# Patient Record
Sex: MALE | Employment: FULL TIME | URBAN - METROPOLITAN AREA
[De-identification: names, ages, dates, MRNs, and addresses within clinical notes are randomized per-mention and may not be internally consistent; named-entity substitution may affect disease eponyms.]

---

## 2017-05-17 ENCOUNTER — HOSPITAL ENCOUNTER (OUTPATIENT)
Dept: PHYSICAL THERAPY | Age: 27
Discharge: HOME | End: 2017-05-17
Payer: COMMERCIAL

## 2017-05-17 PROCEDURE — 97162 PT EVAL MOD COMPLEX 30 MIN: CPT

## 2017-05-17 PROCEDURE — 97535 SELF CARE MNGMENT TRAINING: CPT

## 2017-05-17 PROCEDURE — 97140 MANUAL THERAPY 1/> REGIONS: CPT

## 2017-05-17 NOTE — PROGRESS NOTES
PHYSICAL THERAPY - DAILY TREATMENT NOTE    Patient Name: Mery Mcarthur        Date: 2017  : 1990   YES Patient  Verified  Visit #:      of   12  Insurance: Payor: /      In time: 7:40 Out time: 8:20   Total Treatment Time: 40     Medicare Time Tracking (below)   Total Timed Codes (min):  na 1:1 Treatment Time:  na     TREATMENT AREA =  Low back pain [M54.5]    SUBJECTIVE  Pain Level (on 0 to 10 scale):    Medication Changes/New allergies or changes in medical history, any new surgeries or procedures? NO    If yes, update Summary List   Subjective Functional Status/Changes:  []  No changes reported     SEE IE          OBJECTIVE      10 min Manual Therapy: DTM R RF, QL, Gm, Shot gun tech, R IS ant inn, L5 L ERS, L on L SI lulu   Rationale:      decrease pain, increase ROM and increase tissue extensibility to improve patient's ability to perform ADLs     min Patient Education:  YES  Reviewed HEP   []  Progressed/Changed HEP based on: Other Objective/Functional Measures:    SEE IE     Post Treatment Pain Level (on 0 to 10) scale:       ASSESSMENT  Assessment/Changes in Function:     SEE IE     []  See Progress Note/Recertification   Patient will continue to benefit from skilled PT services to modify and progress therapeutic interventions, address functional mobility deficits, address ROM deficits, address strength deficits, analyze and address soft tissue restrictions, analyze and cue movement patterns, analyze and modify body mechanics/ergonomics and assess and modify postural abnormalities to attain remaining goals. Progress toward goals / Updated goals:         PLAN  []  Upgrade activities as tolerated YES Continue plan of care   []  Discharge due to :    []  Other:      Therapist: Lashawn Diane, PT, OCS, SCS, CSCS    Date: 2017 Time: 7:38 AM       No future appointments.

## 2017-05-17 NOTE — PROGRESS NOTES
[facility] PHYSICAL THERAPY  [address] [place] [address] 201,[facility], [address] [facility] - Phone: [phone]  Fax: [phone] / [phone] [facility]  Patient Name: Luke Oliver : 1990   Medical   Diagnosis: Low back pain [M54.5] Treatment Diagnosis: Low back pain [M54.5]   Onset Date:      Referral Source: Belinda Cortes MD Start of Care [facility] [place]): 2017   Prior Hospitalization: See medical history Provider #: [id number]   Prior Level of Function: Pain free ADLs   Comorbidities: none   Medications: Verified on Patient Summary List   The Plan of Care and following information is based on the information from the initial evaluation.   ===========================================================================================  Assessment / key information:  Luke Oliver is a 24 y.o.  yo male with Dx of Low back pain [M54.5]. He reports insidious onset of low back pain in . He currently rates his pain as 8/10 at worst, 0/10 at best, primarily located at R lower lumbar region. He complains of difficulty and increase pain with standing >30min and bending forward. He also complains of occasional numbness at plantar aspect of his R foot with prolonged sitting. He reports previous MRI showed a bulging disc( Pt did not know the level). Objective Findings:  Lumbar ROM: Flx  = limited by 20%, Ext = limited by 80%, Rot: R = limited by 50%, L = limited by 30%. Manual Muscle Testing:  R hip abd and ext are Reduced. Lumbar/SI Screening:   R illiosacral ant innominate, L5  L ERS, L on L SI innominate noted.    Pt instructed in HEP and will f/u in clinic for PT.  ===========================================================================================  Eval Complexity: History LOW Complexity : Zero comorbidities / personal factors that will impact the outcome / POC;  Examination  MEDIUM Complexity : 3 Standardized tests and measures addressing body structure, function, activity limitation and / or participation in recreation ; Presentation MEDIUM Complexity : Evolving with changing characteristics ; Decision Making MEDIUM Complexity : FOTO score of 26-74; Overall Complexity MEDIUM  Problem List: pain affecting function, decrease ROM, decrease strength, decrease ADL/ functional abilitiies, decrease activity tolerance and decrease flexibility/ joint mobility   Treatment Plan may include any combination of the following: Therapeutic exercise, Therapeutic activities, Neuromuscular re-education, Physical agent/modality, Manual therapy, Patient education, Self Care training and Functional mobility training  Patient / Family readiness to learn indicated by: asking questions, trying to perform skills and interest  Persons(s) to be included in education: patient (P)  Barriers to Learning/Limitations: no  Measures taken: FOTO = 50%   Patient Goal (s): Decrease pain    Patient self reported health status: poor  Rehabilitation Potential: good   Short Term Goals: To be accomplished in  1-2  weeks:  1. Independent with HEP. 2. Decrease max pain 25-50% to assist with ADLs   Long Term Goals: To be accomplished in  3-4  weeks:  1. Decrease max pain 50-75% to assist with ADLs  2. Increase FOTO score to 65% to show functional improvment. 3.  Will rate  >/= +5 on Global Rating of Change and be prepared to DC to HEP. Frequency / Duration:   Patient to be seen  2-3  times per week for 3-4  weeks:  Patient / Caregiver education and instruction: self care and exercises    Therapist Signature: Eric Lazar DPT, OCS, SCS, CSCS Date: 9/19/5814   Certification Period: na Time: 8:30 AM   ===========================================================================================  I certify that the above Physical Therapy Services are being furnished while the patient is under my care.   I agree with the treatment plan and certify that this therapy is necessary. Physician Signature:        Date:       Time:     Please sign and return to [facility] or you may fax the signed copy to [phone]. Thank you.

## 2017-05-19 ENCOUNTER — HOSPITAL ENCOUNTER (OUTPATIENT)
Dept: PHYSICAL THERAPY | Age: 27
Discharge: HOME | End: 2017-05-19
Payer: COMMERCIAL

## 2017-05-19 PROCEDURE — 97110 THERAPEUTIC EXERCISES: CPT

## 2017-05-19 PROCEDURE — 97140 MANUAL THERAPY 1/> REGIONS: CPT

## 2017-05-19 NOTE — PROGRESS NOTES
PHYSICAL THERAPY - DAILY TREATMENT NOTE    Patient Name: Paul Anne        Date: 2017  : 1990   YES Patient  Verified  Visit #:      of   12  Insurance: Payor: Clotilda Meckel / Plan: Faustino Pearson / Product Type: HMO /      In time: 7:00 Out time: 7:50   Total Treatment Time: 50     Medicare Time Tracking (below)   Total Timed Codes (min):  na 1:1 Treatment Time:  na     TREATMENT AREA =  Low back pain [M54.5]    SUBJECTIVE  Pain Level (on 0 to 10 scale):  1-2  / 10   Medication Changes/New allergies or changes in medical history, any new surgeries or procedures? NO    If yes, update Summary List   Subjective Functional Status/Changes:  []  No changes reported     Feeling much better since the last visit. OBJECTIVE      40 min Therapeutic Exercise:  [x]  See flow sheet   Rationale:      increase ROM, increase strength and improve coordination to improve the patients ability to perform ADLs     10 min Manual Therapy: DTM R RF, QL, Gm, L 5 L FRS   Rationale:      decrease pain, increase ROM and increase tissue extensibility to improve patient's ability to perform ADLs       min Patient Education:  YES  Reviewed HEP   []  Progressed/Changed HEP based on: Other Objective/Functional Measures:    Cont to have increased soft tissue tension noted at R QL     Post Treatment Pain Level (on 0 to 10) scale:   0  / 10     ASSESSMENT  Assessment/Changes in Function:     Good magdiel to all initial Rx without increase in pain      []  See Progress Note/Recertification   Patient will continue to benefit from skilled PT services to modify and progress therapeutic interventions, address functional mobility deficits, address ROM deficits, address strength deficits, analyze and address soft tissue restrictions, analyze and cue movement patterns, analyze and modify body mechanics/ergonomics and assess and modify postural abnormalities to attain remaining goals.    Progress toward goals / Updated goals:    Independent with HEP     PLAN  []  Upgrade activities as tolerated YES Continue plan of care   []  Discharge due to :    []  Other:      Therapist: Damaris Guzman, PT, OCS, SCS, CSCS    Date: 5/19/2017 Time: 8:02 AM       Future Appointments  Date Time Provider Magan Silva   5/25/2017 7:00 AM Deondre Hoff PT [facility]

## 2017-05-25 ENCOUNTER — HOSPITAL ENCOUNTER (OUTPATIENT)
Dept: PHYSICAL THERAPY | Age: 27
Discharge: HOME | End: 2017-05-25
Payer: COMMERCIAL

## 2017-05-25 PROCEDURE — 97110 THERAPEUTIC EXERCISES: CPT

## 2017-05-25 PROCEDURE — 97140 MANUAL THERAPY 1/> REGIONS: CPT

## 2017-05-25 NOTE — PROGRESS NOTES
PHYSICAL THERAPY - DAILY TREATMENT NOTE    Patient Name: Sylvain Maxwell        Date: 2017  : 1990   YES Patient  Verified  Visit #:   3   of   12  Insurance: Payor: Helio Guerra / Plan: Bright Jones / Product Type: HMO /      In time: 7:10 Out time: 7:56   Total Treatment Time: 46     Medicare Time Tracking (below)   Total Timed Codes (min):  na 1:1 Treatment Time:  na     TREATMENT AREA =  Low back pain [M54.5]    SUBJECTIVE  Pain Level (on 0 to 10 scale):  0  / 10   Medication Changes/New allergies or changes in medical history, any new surgeries or procedures? NO    If yes, update Summary List   Subjective Functional Status/Changes:  []  No changes reported     I have been doing the HEP and it has been helping          OBJECTIVE    38 min Therapeutic Exercise: [x] See flow sheet   Rationale: increase ROM, increase strength and improve coordination to improve the patients ability to perform ADLs      8 min Manual Therapy: DTM R RF, QL, Gm   Rationale: decrease pain, increase ROM and increase tissue extensibility to improve patient's ability to perform ADLs        min Patient Education:  YES  Reviewed HEP   []  Progressed/Changed HEP based on: Other Objective/Functional Measures:    No lumbar or SI innominate noted today     Post Treatment Pain Level (on 0 to 10) scale:   0  / 10     ASSESSMENT  Assessment/Changes in Function:     Good magdiel to all Rx without increase in pain      []  See Progress Note/Recertification   Patient will continue to benefit from skilled PT services to modify and progress therapeutic interventions, address functional mobility deficits, address ROM deficits, address strength deficits, analyze and address soft tissue restrictions, analyze and cue movement patterns, analyze and modify body mechanics/ergonomics and assess and modify postural abnormalities to attain remaining goals.    Progress toward goals / Updated goals:    Progressing well with pain reduction PLAN  []  Upgrade activities as tolerated YES Continue plan of care   []  Discharge due to :    []  Other:      Therapist: Hue Grayson, PT, OCS, SCS, CSCS    Date: 5/25/2017 Time: 7:09 AM       No future appointments.

## 2017-06-01 ENCOUNTER — HOSPITAL ENCOUNTER (OUTPATIENT)
Dept: PHYSICAL THERAPY | Age: 27
Discharge: HOME | End: 2017-06-01
Payer: COMMERCIAL

## 2017-06-01 PROCEDURE — 97110 THERAPEUTIC EXERCISES: CPT

## 2017-06-01 PROCEDURE — 97140 MANUAL THERAPY 1/> REGIONS: CPT

## 2017-06-01 NOTE — PROGRESS NOTES
PHYSICAL THERAPY - DAILY TREATMENT NOTE    Patient Name: Lyssa Lang        Date: 2017  : 1990   YES Patient  Verified  Visit #:     Insurance: Payor: Gale Penn / Plan: Samina Weiss / Product Type: HMO /      In time: 505 Out time: 550   Total Treatment Time: 45       TREATMENT AREA = Low back pain [M54.5]    SUBJECTIVE  Pain Level (on 0 to 10 scale):  2  / 10   Medication Changes/New allergies or changes in medical history, any new surgeries or procedures?     NO    If yes, update Summary List   Subjective Functional Status/Changes:  []  No changes reported     Reports increase in s/s after going to gym for leg day since last visit           OBJECTIVE  Modalities Rationale:      to improve patient's ability to    min [] Estim, type/location:                                      []  att     []  unatt     []  w/US     []  w/ice    []  w/heat    min []  Mechanical Traction: type/lbs                   []  pro   []  sup   []  int   []  cont    []  before manual    []  after manual    min []  Ultrasound, settings/location:      min []  Iontophoresis w/ dexamethasone, location:                                               []  take home patch       []  in clinic    min []  Ice     []  Heat    location/position:     min []  Vasopneumatic Device, press/temp:     min []  Other:    [] Skin assessment post-treatment (if applicable):    []  intact    []  redness- no adverse reaction     []redness  adverse reaction:        35 min Therapeutic Exercise:  [x]  See flow sheet   Rationale:      increase ROM and increase strength to improve the patients ability to perform ADLs     10 Min Manual Therapy: DTM to (L) piriformis, (R) QL, glute med, f/b pubic clearing technique, MET for [facility] L5/S1   Rationale:      decrease pain, increase ROM and increase tissue extensibility to improve patient's ability to perform ADLs      min Patient Education:  YES  Reviewed HEP   []  Progressed/Changed HEP based on: Other Objective/Functional Measures:    Demonstrates soreness in (R) QL, (R) glute med, with increased hypertonicity in (R) gluteal region and lumbar region noted      Post Treatment Pain Level (on 0 to 10) scale:   0  / 10     ASSESSMENT  Assessment/Changes in Function:     Progressing slowly with overall function and ability to return to gym based oroigram      []  See Progress Note/Recertification   Patient will continue to benefit from skilled PT services to modify and progress therapeutic interventions, address functional mobility deficits, address ROM deficits, address strength deficits, analyze and address soft tissue restrictions and analyze and cue movement patterns to attain remaining goals. Progress toward goals / Updated goals:    No changes towards goals this visit, will monitor and progress as able      PLAN  []  Upgrade activities as tolerated YES Continue plan of care   []  Discharge due to :    []  Other:      Therapist: Vivien Huang DPT, CIMT    Date: 6/1/2017 Time: 5:31 PM       No future appointments.

## 2017-06-15 ENCOUNTER — HOSPITAL ENCOUNTER (OUTPATIENT)
Dept: PHYSICAL THERAPY | Age: 27
Discharge: HOME | End: 2017-06-15
Payer: COMMERCIAL

## 2017-06-15 PROCEDURE — 97110 THERAPEUTIC EXERCISES: CPT

## 2017-06-15 NOTE — PROGRESS NOTES
PHYSICAL THERAPY - DAILY TREATMENT NOTE    Patient Name: Marquis Maurer        Date: 6/15/2017  : 1990   YES Patient  Verified  Visit #:      of   12  Insurance: Payor: Flakita Tejeda / Plan: Nikko Darshan / Product Type: HMO /      In time: 7:40 Out time: 8:35   Total Treatment Time: 55     Medicare Time Tracking (below)   Total Timed Codes (min):  na 1:1 Treatment Time:  na     TREATMENT AREA =  Low back pain [M54.5]    SUBJECTIVE  Pain Level (on 0 to 10 scale):  0  / 10   Medication Changes/New allergies or changes in medical history, any new surgeries or procedures? NO    If yes, update Summary List   Subjective Functional Status/Changes:  []  No changes reported     I still feel the numbness at the bottom of my foot when I sit for a long period of time, but it is getting much better. OBJECTIVE    55 min Therapeutic Exercise: [x] See flow sheet   Rationale: increase ROM, increase strength and improve coordination to improve the patients ability to perform ADLs         min Patient Education:  YES  Reviewed HEP   []  Progressed/Changed HEP based on: Other Objective/Functional Measures:    Demonstrate instability with L 1/2 kneel      Post Treatment Pain Level (on 0 to 10) scale:   0  / 10     ASSESSMENT  Assessment/Changes in Function:     Good magdiel to all Rx without increase in pain      []  See Progress Note/Recertification   Patient will continue to benefit from skilled PT services to modify and progress therapeutic interventions, address functional mobility deficits, address ROM deficits, address strength deficits, analyze and address soft tissue restrictions, analyze and cue movement patterns, analyze and modify body mechanics/ergonomics and assess and modify postural abnormalities to attain remaining goals.    Progress toward goals / Updated goals:    Slowly progressing with pain reduction      PLAN  []  Upgrade activities as tolerated YES Continue plan of care   []  Discharge due to :    []  Other:      Therapist: Abelardo Anna, PT, OCS, SCS, CSCS    Date: 6/15/2017 Time: 6:33 AM       Future Appointments  Date Time Provider Magan Silva   6/15/2017 7:30 AM Taylor Spaulding, PT [facility]   6/20/2017 5:30 PM Taylor Spaulding PT [facility]

## 2017-06-20 ENCOUNTER — APPOINTMENT (OUTPATIENT)
Dept: PHYSICAL THERAPY | Age: 27
End: 2017-06-20
Payer: COMMERCIAL

## 2017-06-23 ENCOUNTER — HOSPITAL ENCOUNTER (OUTPATIENT)
Dept: PHYSICAL THERAPY | Age: 27
Discharge: HOME | End: 2017-06-23
Payer: COMMERCIAL

## 2017-06-23 PROCEDURE — 97140 MANUAL THERAPY 1/> REGIONS: CPT

## 2017-06-23 PROCEDURE — 97110 THERAPEUTIC EXERCISES: CPT

## 2017-06-23 NOTE — PROGRESS NOTES
[facility] PHYSICAL THERAPY  [address] [place] [address] 51, [facility] 201,[facility], 70 [facility] - Phone: [phone]  Fax: [phone]  DISCHARGE NOTE  Patient Name: Mery Mcarthur : 1990   Treatment/Medical Diagnosis: Low back pain [M54.5]   Referral Source: Sharan Corley MD     Date of Initial Visit: 17 Attended Visits: 6 Missed Visits: 1     SUMMARY OF TREATMENT  Mery Mcarthur has been seen at our clinic 2-3x/wk for a total of 6 visits. Pt treatment has consisted of  therapeutic exercise for lumbar ROM, hip/core strengthening, and manual therapy(lumbar/SIjt mobilization and deep tissue mobilization at R Rectus Femoris, QL, GLute med)  CURRENT STATUS  Pt has had a good tolerance to physical therapy treatment. He reports improved ~90% improvement with his low back pain. He continues to demonstrate some hip instabilities. He has not returned to workout regime. However, we feel that he will bel able to continue to progress with his pain reduction and function independently at this time. Goal/Measure of Progress Goal Met? 1. Decrease Max pain by 50-75% to assist with ADLs   Status at last Eval: 8/10 Current Status: 1/10 yes   2. Increase FOTO score to 65 % show functional improvement   Status at last Eval: 50% Current Status: 63% progressing   3. Will rate >/= +5 on Global Rating of Change and be prepared to DC to HEP. Status at last Eval: na Current Status: +6 yes       RECOMMENDATIONS  Discharge with HEP. If you have any questions/comments please contact us directly at [phone]. Thank you for allowing us to assist in the care of your patient.     Therapist Signature: Lashawn Diane, DPT, OCS, SCS, CSCS Date: 2017     Time: 8:52 AM

## 2017-06-23 NOTE — PROGRESS NOTES
PHYSICAL THERAPY - DAILY TREATMENT NOTE    Patient Name: Shankar Lerma        Date: 2017  : 1990   YES Patient  Verified  Visit #:      12  Insurance: Payor: Kaz George / Plan: Rollo Filter / Product Type: HMO /      In time: 6:40 Out time: 7:20   Total Treatment Time: 40     Medicare Time Tracking (below)   Total Timed Codes (min):  na 1:1 Treatment Time:  na     TREATMENT AREA =  Low back pain [M54.5]    SUBJECTIVE  Pain Level (on 0 to 10 scale):  1  / 10   Medication Changes/New allergies or changes in medical history, any new surgeries or procedures? NO    If yes, update Summary List   Subjective Functional Status/Changes:  []  No changes reported     My LB is getting better. I dont really feel the pain. I just have a light pain in my mid back today          OBJECTIVE    30 min Therapeutic Exercise: [x] See flow sheet   Rationale: increase ROM, increase strength and improve coordination to improve the patients ability to perform ADLs       10 min Manual Therapy: DTM para T/S manip   Rationale: decrease pain, increase ROM and increase tissue extensibility to improve patient's ability to perform ADLs         min Patient Education:  YES  Reviewed HEP   []  Progressed/Changed HEP based on: Other Objective/Functional Measures:    FOTO = 63  GROC - +6     Post Treatment Pain Level (on 0 to 10) scale:   0  / 10     ASSESSMENT  Assessment/Changes in Function:     Good magdiel to all Rx without increase in pain      []  See Progress Note/Recertification   Patient will continue to benefit from skilled PT services to modify and progress therapeutic interventions, address functional mobility deficits, address ROM deficits, address strength deficits, analyze and address soft tissue restrictions, analyze and cue movement patterns, analyze and modify body mechanics/ergonomics and assess and modify postural abnormalities to attain remaining goals.    Progress toward goals / Updated goals:    Progressing well with symptom reduction      PLAN  []  Upgrade activities as tolerated YES Continue plan of care   []  Discharge due to :    []  Other:      Therapist: Heidi Horne, PT, OCS, SCS, CSCS    Date: 6/23/2017 Time: 6:58 AM       No future appointments.

## 2017-06-30 ENCOUNTER — HOSPITAL ENCOUNTER (OUTPATIENT)
Dept: PHYSICAL THERAPY | Age: 27
Discharge: HOME | End: 2017-06-30
Payer: COMMERCIAL

## 2017-06-30 PROCEDURE — 97535 SELF CARE MNGMENT TRAINING: CPT

## 2017-06-30 PROCEDURE — 97140 MANUAL THERAPY 1/> REGIONS: CPT

## 2017-06-30 PROCEDURE — 97162 PT EVAL MOD COMPLEX 30 MIN: CPT

## 2017-06-30 NOTE — IP AVS SNAPSHOT
Summary of Care Report The Summary of Care report has been created to help improve care coordination. Users with access to [employer] or [address] (Web-based application) may access additional patient information including the Discharge Summary. If you are not currently a [address] user and need more information, please call the number listed below in the [place] [address] section and ask to be connected with Medical Records. Facility Information Name Address Phone [address] [facility] [place] [id number] [phone] Patient Information Patient Name Sex KATHRYN Aguilar ([id number]) Male 1990 Discharge Information Admitting Provider Service Area Unit  
 (none) [address] [facility] / [phone] Discharge Provider Discharge Date/Time Discharge Disposition Destination (none) (none) (none) (none) Patient Language Language ENGLISH [13] You are allergic to the following Not on File Current Discharge Medication List  
  
Notice You have not been prescribed any medications. Follow-up Information None Discharge Instructions None Chart Review Routing History No Routing History on File

## 2017-06-30 NOTE — PROGRESS NOTES
[facility] PHYSICAL THERAPY  [address] [place] [address] 51, Jerre Hunger 201,[place], [address] [facility] [facility] [address] - Phone: [phone]  Fax: [phone] / 1568 [facility]  Patient Name: Julio Petersen : 1990   Medical   Diagnosis: Neck pain [M54.2] Treatment Diagnosis: Neck pain [M54.2]   Onset Date: ~3mo ago     Referral Source: Al Haas MD Start of Care [facility], [place]): 2017   Prior Hospitalization: See medical history Provider #: [id number]   Prior Level of Function: Pain free ADLs   Comorbidities: none   Medications: Verified on Patient Summary List   The Plan of Care and following information is based on the information from the initial evaluation.   ===========================================================================================  Assessment / key information:  Julio Petersen is a 24 y.o.  yo male with Dx of Neck pain [M54.2]. He reports insidious onset of R side neck pain ~3month ago. He currently rates hie pain as 9/10 at worst, 0/10 at best, primarily located at R upper trapezius and upper back region. He complains of difficulty and increase pain with prolonged sitting. Objective Findings:  Cervical ROM: Flx  = limited by 10%, Ext = WNL, Rot: R = limited by 20%, L =limited by 10%, Thoracic ROM are limited in R Rot by 30% and L Rot by 20%. Palpation:  Decrease mobility noted from T1-T7. Elevated 1st rib noted on R. Pt instructed in HEP and will f/u in clinic for PT.  ===========================================================================================  Eval Complexity: History LOW Complexity : Zero comorbidities / personal factors that will impact the outcome / POC;  Examination  MEDIUM Complexity : 3 Standardized tests and measures addressing body structure, function, activity limitation and / or participation in recreation ; Presentation MEDIUM Complexity : Evolving with changing characteristics ;   Decision Making MEDIUM Complexity : FOTO score of 26-74; Overall Complexity MEDIUM  Problem List: pain affecting function, decrease ROM, decrease strength, decrease ADL/ functional abilitiies, decrease activity tolerance and decrease flexibility/ joint mobility   Treatment Plan may include any combination of the following: Therapeutic exercise, Therapeutic activities, Neuromuscular re-education, Physical agent/modality, Manual therapy, Patient education, Self Care training and Functional mobility training  Patient / Family readiness to learn indicated by: asking questions, trying to perform skills and interest  Persons(s) to be included in education: patient (P)  Barriers to Learning/Limitations: no  Measures taken: FOTO = 60%   Patient Goal (s): Decrease pain    Patient self reported health status: fair  Rehabilitation Potential: good   Short Term Goals: To be accomplished in  1-2  weeks:  1. Independent with HEP. 2. Decrease max pain 25-50% to assist with ADLs   Long Term Goals: To be accomplished in  3-4  weeks:  1. Decrease max pain 50-75% to assist with ADLs  2. Increase FOTO score to 70% to show functional improvment  3. Will rate  >/= +5 on Global Rating of Change and be prepared to DC to HEP. Frequency / Duration:   Patient to be seen  2-3  times per week for 3-4  weeks:  Patient / Caregiver education and instruction: self care and exercises    Therapist Signature: Johnnie Quinteros DPT, OCS, SCS, CSCS Date: 0/90/7769   Certification Period: na Time: 8:38 AM   ==================================================================================  I certify that the above Physical Therapy Services are being furnished while the patient is under my care. I agree with the treatment plan and certify that this therapy is necessary. Physician Signature:        Date:       Time:     Please sign and return to [facility] [place] or you may fax the signed copy to [phone]. Thank you.

## 2017-06-30 NOTE — PROGRESS NOTES
PHYSICAL THERAPY - DAILY TREATMENT NOTE    Patient Name: Coleman Gamez        Date: 2017  : 1990   YES Patient  Verified  Visit #:      of   12  Insurance: Payor: Selvin Cesar / Plan: Danitza Ho / Product Type: HMO /      In time: 8:05 Out time: 8:45   Total Treatment Time: 40     Medicare Time Tracking (below)   Total Timed Codes (min):  na 1:1 Treatment Time:  na     TREATMENT AREA =  Neck pain [M54.2]    SUBJECTIVE  Pain Level (on 0 to 10 scale):    Medication Changes/New allergies or changes in medical history, any new surgeries or procedures? NO    If yes, update Summary List   Subjective Functional Status/Changes:  []  No changes reported     SEE IE          OBJECTIVE    10 min Manual Therapy: T/S mob, DTM R Scalene, Pec, UT, and R 1st rib mob   Rationale:      decrease pain, increase ROM and increase tissue extensibility to improve patient's ability to perform ADLs     min Patient Education:  YES  Reviewed HEP   []  Progressed/Changed HEP based on: Other Objective/Functional Measures:    SEE IE     Post Treatment Pain Level (on 0 to 10) scale:       ASSESSMENT  Assessment/Changes in Function:     SEE IE     []  See Progress Note/Recertification   Patient will continue to benefit from skilled PT services to modify and progress therapeutic interventions, address functional mobility deficits, address ROM deficits, address strength deficits, analyze and address soft tissue restrictions, analyze and cue movement patterns, analyze and modify body mechanics/ergonomics and assess and modify postural abnormalities to attain remaining goals. Progress toward goals / Updated goals:         PLAN  []  Upgrade activities as tolerated YES Continue plan of care   []  Discharge due to :    []  Other:      Therapist: Patrick Lake, PT, OCS, SCS, CSCS    Date: 2017 Time: 8:36 AM       No future appointments.

## 2017-07-14 ENCOUNTER — HOSPITAL ENCOUNTER (OUTPATIENT)
Dept: PHYSICAL THERAPY | Age: 27
Discharge: HOME | End: 2017-07-14
Payer: COMMERCIAL

## 2017-07-14 PROCEDURE — 97110 THERAPEUTIC EXERCISES: CPT

## 2017-07-14 PROCEDURE — 97140 MANUAL THERAPY 1/> REGIONS: CPT

## 2017-07-14 NOTE — PROGRESS NOTES
PHYSICAL THERAPY - DAILY TREATMENT NOTE    Patient Name: Darline Sprague        Date: 2017  : 1990   YES Patient  Verified  Visit #:      of   12  Insurance: Payor: Hadley Epley / Plan: [facility] / Product Type: HMO /      In time: 7:35 Out time: 8:25   Total Treatment Time: 50     Medicare Time Tracking (below)   Total Timed Codes (min):  na 1:1 Treatment Time:  na     TREATMENT AREA =  Neck pain [M54.2]    SUBJECTIVE  Pain Level (on 0 to 10 scale):  1-2   10   Medication Changes/New allergies or changes in medical history, any new surgeries or procedures? NO    If yes, update Summary List   Subjective Functional Status/Changes:  []  No changes reported     Its definitely better, but I still feel very tight           OBJECTIVE    40 min Therapeutic Exercise:  [x]  See flow sheet   Rationale:      increase ROM, increase strength and improve coordination to improve the patients ability to perform ADLs     10 min Manual Therapy: T/S mob, DTM R  UT   Rationale:      decrease pain, increase ROM and increase tissue extensibility to improve patient's ability to perform ADLs     min Patient Education:  YES  Reviewed HEP   []  Progressed/Changed HEP based on: Other Objective/Functional Measures:    Cont to have increased soft tissue tension noted at R UT     Post Treatment Pain Level (on 0 to 10) scale:   1  / 10     ASSESSMENT  Assessment/Changes in Function:     Good magdiel to all initial Rx without increase in pain      []  See Progress Note/Recertification   Patient will continue to benefit from skilled PT services to modify and progress therapeutic interventions, address functional mobility deficits, address ROM deficits, address strength deficits, analyze and address soft tissue restrictions, analyze and cue movement patterns, analyze and modify body mechanics/ergonomics and assess and modify postural abnormalities to attain remaining goals.    Progress toward goals / Updated goals:    Independent with all Rx     PLAN  []  Upgrade activities as tolerated YES Continue plan of care   []  Discharge due to :    []  Other:      Therapist: Shalini Levin, PT, OCS, SCS, CSCS    Date: 7/14/2017 Time: 6:31 AM       Future Appointments  Date Time Provider Magan Silva   7/14/2017 7:30 AM INA Olivares [facility]   7/17/2017 7:00 AM Bonita Flores PT [address] [place] [address]

## 2017-08-07 NOTE — PROGRESS NOTES
[address] [place] [address] PHYSICAL THERAPY   [facility] 51, Flakita Craig 201,[facility], [address] [facility] - Phone: [phone]  Fax: [phone] SUMMARY  Patient Name: Carolina Fernandes : 1990   Treatment/Medical Diagnosis: Neck pain [M54.2]   Referral Source: Christie Haile MD     Date of Initial Visit: 17 Attended Visits: 2 Missed Visits: 0     SUMMARY OF TREATMENT  Carolina Fernandes has been seen at our clinic 2-3x/wk for a total of 2 visits. Pt treatment has consisted of  therapeutic exercise for cervical ROM, postural correction, and manual therapy (deep tissue mobilizations and cervical/thoracic mobilzations)  CURRENT STATUS  Pt has had a good tolerance to physical therapy treatment. He reports significant improvement with his pain level after the 1st visit, and did not return to PT treatment after the 2nd visit. However, we believe that he will be able to continue to progress with his pain reduction and function independently at this point. Goal/Measure of Progress Goal Met? 1. Decrease Max pain by 50-75% to assist with ADLs   Status at last Eval: 9/10 Current Status: 1-2/10 yes   2. Increase FOTO score to 70 % show functional improvement   Status at last Eval: 60% Current Status: na n/a   3. Will rate >/= +5 on Global Rating of Change and be prepared to DC to HEP. Status at last Eval: na Current Status: na n/a       RECOMMENDATIONS  Discharge from physical therapy treatment with HEP. Specifics:   If you have any questions/comments please contact us directly at [phone]. Thank you for allowing us to assist in the care of your patient.     Therapist Signature: Sarah Garrison, IRMA, OCS, SCS, CSCS Date: 2017     Time: 12:12 PM

## 2017-08-31 ENCOUNTER — HOSPITAL ENCOUNTER (OUTPATIENT)
Dept: PHYSICAL THERAPY | Age: 27
Discharge: HOME | End: 2017-08-31
Payer: COMMERCIAL

## 2017-08-31 PROCEDURE — 97535 SELF CARE MNGMENT TRAINING: CPT

## 2017-08-31 PROCEDURE — 97162 PT EVAL MOD COMPLEX 30 MIN: CPT

## 2017-08-31 PROCEDURE — 97140 MANUAL THERAPY 1/> REGIONS: CPT

## 2017-08-31 NOTE — PROGRESS NOTES
[facility] PHYSICAL THERAPY  [address] [place] [address] 51, [address] 201,[place], [address] [facility] [address] - Phone: [phone]  Fax: [phone] / [address]  Patient Name: Gayle Hayden : 1990   Medical   Diagnosis: Chronic neck and back pain [M54.2, M54.9] Treatment Diagnosis: Chronic neck and back pain [M54.2, M54.9]   Onset Date: 17     Referral Source: Lars Devlin MD [facility]): 2017   Prior Hospitalization: See medical history Provider #: [id number]   Prior Level of Function: Pain free ADLs   Comorbidities: none   Medications: Verified on Patient Summary List   The Plan of Care and following information is based on the information from the initial evaluation.   ===========================================================================================  Assessment / garces information:  Gayle Hayden is a 24 y.o.  yo male with Dx of Chronic neck and back pain [M54.2, M54.9]. He reports waking up with R UE pain on 17. He currently rates his pain as 5/10 at worst, 0/10 at best, primarily located at R cervical region as well as lateral aspect of his R UE. He complains of difficulty and increase pain with looking down. Objective Findings:  Cervical ROM: Flx  = WNL, Ext = WNL, Rot: R = limited by 10%, L =WNL . Manual Muscle Testing: All UE strength are WNL. Special Test:   Compression/Distraction Test and Spurlings Test:  - bilaterally. Palpation:  Increased soft tissue tension noted at R scalene, UT, infraspinatus and teres. Decreased upper thoracic mobility noted on R.   Pt instructed in HEP and will f/u in clinic for PT.  ===========================================================================================  Eval Complexity: History LOW Complexity : Zero comorbidities / personal factors that will impact the outcome / POC;  Examination  MEDIUM Complexity : 3 Standardized tests and measures addressing body structure, function, activity limitation and / or participation in recreation ; Presentation MEDIUM Complexity : Evolving with changing characteristics ; Decision Making MEDIUM Complexity : FOTO score of 26-74; Overall Complexity MEDIUM  Problem List: pain affecting function, decrease ROM, decrease strength, decrease ADL/ functional abilitiies, decrease activity tolerance and decrease flexibility/ joint mobility   Treatment Plan may include any combination of the following: Therapeutic exercise, Therapeutic activities, Neuromuscular re-education, Physical agent/modality, Manual therapy, Patient education, Self Care training and Functional mobility training  Patient / Family readiness to learn indicated by: asking questions, trying to perform skills and interest  Persons(s) to be included in education: patient (P)  Barriers to Learning/Limitations: no  Measures taken: FOTO = 46%   Patient Goal (s): Decrease pain    Patient self reported health status: fair  Rehabilitation Potential: good   Short Term Goals: To be accomplished in  1-2  weeks:  1. Independent with HEP. 2. Decrease max pain 25-50% to assist with ADLs   Long Term Goals: To be accomplished in  3-4  weeks:  1. Decrease max pain 50-75% to assist with ADLs  2. Increase FOTO score to 65% to show functional improvment  3. Will rate  >/= +5 on Global Rating of Change and be prepared to DC to HEP. Frequency / Duration:   Patient to be seen  2-3  times per week for 3-4  weeks:  Patient / Caregiver education and instruction: self care and exercises    Therapist Signature: Perry Bonilla DPT, OCS, SCS, CSCS Date: 5/60/2874   Certification Period: na Time: 8:57 AM   ==================================================================================  I certify that the above Physical Therapy Services are being furnished while the patient is under my care.   I agree with the treatment plan and certify that this therapy is necessary. Physician Signature:        Date:       Time:     Please sign and return to [facility] or you may fax the signed copy to [phone]. Thank you.

## 2017-08-31 NOTE — PROGRESS NOTES
PHYSICAL THERAPY - DAILY TREATMENT NOTE    Patient Name: Jada Cook        Date: 2017  : 1990   YES Patient  Verified  Visit #:      12  Insurance: Payor: Elois Schlatter / Plan: Christine Mask / Product Type: HMO /      In time: 8:30 Out time: 9:10   Total Treatment Time: 40     Medicare Time Tracking (below)   Total Timed Codes (min):  na 1:1 Treatment Time:  na     TREATMENT AREA =  Chronic neck and back pain [M54.2, M54.9]    SUBJECTIVE  Pain Level (on 0 to 10 scale):    Medication Changes/New allergies or changes in medical history, any new surgeries or procedures? NO    If yes, update Summary List   Subjective Functional Status/Changes:  []  No changes reported     SEE IE          OBJECTIVE      10 min Manual Therapy: R rib mob, DTM R scalene, infra, teres, R 1st rib mob, T1 SG on R   Rationale:      decrease pain, increase ROM and increase tissue extensibility to improve patient's ability to perform ADLs     min Patient Education:  YES  Reviewed HEP   []  Progressed/Changed HEP based on: Other Objective/Functional Measures:    SEE IE     Post Treatment Pain Level (on 0 to 10) scale:       ASSESSMENT  Assessment/Changes in Function:     SEE IE     []  See Progress Note/Recertification   Patient will continue to benefit from skilled PT services to modify and progress therapeutic interventions, address functional mobility deficits, address ROM deficits, address strength deficits, analyze and address soft tissue restrictions, analyze and cue movement patterns, analyze and modify body mechanics/ergonomics and assess and modify postural abnormalities to attain remaining goals.    Progress toward goals / Updated goals:         PLAN  []  Upgrade activities as tolerated YES Continue plan of care   []  Discharge due to :    []  Other:      Therapist: Candi Varner, PT, OCS, SCS, CSCS    Date: 2017 Time: 9:03 AM       Future Appointments  Date Time Provider Magan Silva   2017 6:30 AM Jeremias Preston PT [facility]   9/21/2017 8:00 AM Jeremias Preston PT [facility]

## 2017-08-31 NOTE — PROGRESS NOTES
[facility] PHYSICAL THERAPY  [address] [place] [address] 201,[facility], 70 [facility] - Phone: [phone]  Fax: [phone]  Request for use of Dry Needling/Intramuscular Manual Therapy  Patient Name: Garfield Nuñez : 1990   Treatment/Medical Diagnosis: Chronic neck and back pain [M54.2, M54.9]   Referral Source: Kati Ortiz MD     Date of Initial Visit: 17 Attended Visits: 1 Missed Visits: 0     Based on findings from the physical therapy examination and evaluation, the evaluating therapist believes the patient, Garfield Nuñez would benefit from including Dry Needling as part of the plan of care. Dry needling is an effective treatment technique utilized in conjunction with other physical therapy interventions to inactivate myofascial trigger points and the pain and dysfunction they cause. It involves the use of a very fine (usually 0.3 mm/30 gauge) solid filament sterile needle (also used for acupuncture) which is inserted into the skin and directly into a myofascial trigger point. Repeated strokes or movements of the needle without completely withdrawing it help to inactive the trigger point, all of which may take approximately 30 - 60 seconds at each site. Benefits include inactivation of trigger points, decreased pain, increased muscle length, improved movement patterns, and restoration of function. Potential risks include the following: post-needling soreness, infection, bruising/bleeding, penetration of a nerve, and pneumothorax. All treating therapist have been thoroughly educated in ways to avoid the adverse reactions. Dry Needling is an advanced procedure that requires additional training including greater than 54 hours of intensive course work.  Most treatment programs will consist of one session of dry needling each week and a possible second treatment to consist of muscle re-education, flexibility, strengthening and other manual techniques to facilitate the benefits from the dry needling therapy. If you agree with this recommendation, please sign the attached prescription form and fax it to us at [phone]. If you have questions or concerns regarding dry needling or any other treatment we may be providing, please contact us at [phone]. Thank you for allowing us to assist in the care of your patient. Therapist Signature: Maxx Valentino, PT, OCS, SCS, CSCS Date: 8/31/2017     Time: 9:02 AM   NOTE TO PHYSICIAN:  PLEASE COMPLETE THE ORDERS BELOW AND FAX TO   [facility]: [phone]  If you are unable to process this request in 24 hours please contact our office: [phone] I have read the above request and AGREE to the recommendation of including dry needling as part of the plan of care. ? I have read the above request and DO NOT AGREE to including dry needling as part of the plan of care.    ? I have read the above report and request that my patient continue therapy with the following changes/special instructions: _________________________________________________     Physician Signature:        Date:       Time:

## 2017-09-06 ENCOUNTER — HOSPITAL ENCOUNTER (OUTPATIENT)
Dept: PHYSICAL THERAPY | Age: 27
Discharge: HOME | End: 2017-09-06
Payer: COMMERCIAL

## 2017-09-06 PROCEDURE — 97110 THERAPEUTIC EXERCISES: CPT

## 2017-09-06 PROCEDURE — 97140 MANUAL THERAPY 1/> REGIONS: CPT

## 2017-09-06 NOTE — PROGRESS NOTES
PHYSICAL THERAPY - DAILY TREATMENT NOTE    Patient Name: Kaylen Payne        Date: 2017  : 1990   YES Patient  Verified  Visit #:   2   of   12  Insurance: Payor: Stef Archuleta / Plan: Gerardo Wiseman / Product Type: HMO /      In time: 6:20 Out time: 7:20   Total Treatment Time: 40     Medicare Time Tracking (below)   Total Timed Codes (min):  na 1:1 Treatment Time:  na     TREATMENT AREA =  Chronic neck and back pain [M54.2, M54.9]    SUBJECTIVE  Pain Level (on 0 to 10 scale):  0  / 10   Medication Changes/New allergies or changes in medical history, any new surgeries or procedures? NO    If yes, update Summary List   Subjective Functional Status/Changes:  []  No changes reported     Its just feel very tight at R UT          OBJECTIVE    30 min Therapeutic Exercise:  [x]  See flow sheet   Rationale:      increase ROM, increase strength and improve coordination to improve the patients ability to perform ADLs     10 min Manual Therapy: DTM R pec, UT, t/s mob   Rationale:      decrease pain, increase ROM and increase tissue extensibility to improve patient's ability to perform ADLs     min Patient Education:  YES  Reviewed HEP   []  Progressed/Changed HEP based on: Other Objective/Functional Measures:    Full cervical ROM in all planes, but limited in R t/s rot      Post Treatment Pain Level (on 0 to 10) scale:   0  / 10     ASSESSMENT  Assessment/Changes in Function:     Good magdiel to all Rx without increase in pain      []  See Progress Note/Recertification   Patient will continue to benefit from skilled PT services to modify and progress therapeutic interventions, address functional mobility deficits, address ROM deficits, address strength deficits, analyze and address soft tissue restrictions, analyze and cue movement patterns, analyze and modify body mechanics/ergonomics and assess and modify postural abnormalities to attain remaining goals.    Progress toward goals / Updated goals:    Independent with HEP     PLAN  []  Upgrade activities as tolerated YES Continue plan of care   []  Discharge due to :    []  Other:      Therapist: Candi Varner, PT, OCS, SCS, CSCS    Date: 9/6/2017 Time: 6:31 AM       Future Appointments  Date Time Provider Magan Silva   9/21/2017 8:00 AM [facility]

## 2017-09-21 ENCOUNTER — HOSPITAL ENCOUNTER (OUTPATIENT)
Dept: PHYSICAL THERAPY | Age: 27
Discharge: HOME | End: 2017-09-21
Payer: COMMERCIAL

## 2017-09-21 PROCEDURE — 97110 THERAPEUTIC EXERCISES: CPT

## 2017-09-21 PROCEDURE — 97140 MANUAL THERAPY 1/> REGIONS: CPT

## 2017-09-21 NOTE — PROGRESS NOTES
PHYSICAL THERAPY - DAILY TREATMENT NOTE    Patient Name: Brett Colunga        Date: 2017  : 1990   YES Patient  Verified  Visit #:   3   of   12  Insurance: Payor: Carmen Severance / Plan: [employer] / Product Type: HMO /      In time: 8:10 Out time: 8:40   Total Treatment Time: 30     Medicare Time Tracking (below)   Total Timed Codes (min):  na 1:1 Treatment Time:  na     TREATMENT AREA =  Chronic neck and back pain [M54.2, M54.9]    SUBJECTIVE  Pain Level (on 0 to 10 scale):  1-2  / 10   Medication Changes/New allergies or changes in medical history, any new surgeries or procedures? NO    If yes, update Summary List   Subjective Functional Status/Changes:  []  No changes reported     Just still getting tight, but havent had any major pain           OBJECTIVE    22 min Therapeutic Exercise:  [x]  See flow sheet   Rationale:      increase ROM, increase strength and improve coordination to improve the patients ability to perform ADLs      8 min Manual Therapy: DTM UT, t/s mob   Rationale:      decrease pain, increase ROM and increase tissue extensibility to improve patient's ability to perform ADLs      min Patient Education:  YES  Reviewed HEP   []  Progressed/Changed HEP based on: Other Objective/Functional Measures:    Cont to have increased soft tissue tension noted at UT     Post Treatment Pain Level (on 0 to 10) scale:   0  / 10     ASSESSMENT  Assessment/Changes in Function:     Good magdiel to all Rx without increase in pain      []  See Progress Note/Recertification   Patient will continue to benefit from skilled PT services to modify and progress therapeutic interventions, address functional mobility deficits, address ROM deficits, address strength deficits, analyze and address soft tissue restrictions, analyze and cue movement patterns, analyze and modify body mechanics/ergonomics and assess and modify postural abnormalities to attain remaining goals.    Progress toward goals / Updated goals:    Slowly progressing with pain reduction      PLAN  []  Upgrade activities as tolerated YES Continue plan of care   []  Discharge due to :    []  Other:      Therapist: Daysi Diaz, PT, OCS, SCS, CSCS    Date: 9/21/2017 Time: 6:35 AM       Future Appointments  Date Time Provider Magan Silva   9/21/2017 8:00 AM INA Atkins [facility]

## 2017-10-05 ENCOUNTER — APPOINTMENT (OUTPATIENT)
Dept: PHYSICAL THERAPY | Age: 27
End: 2017-10-05

## 2017-10-23 NOTE — PROGRESS NOTES
[facility] PHYSICAL THERAPY  [address] [place] [address], [place] [address],[facility], 70 [facility] - Phone: [phone]  Fax: [phone]  DISCHARGE SUMMARY  Patient Name: Darline Sprague : 1990   Treatment/Medical Diagnosis: Chronic neck and back pain [M54.2, M54.9]   Referral Source: Kofi Godfrey MD     Date of Initial Visit: 17 Attended Visits: 3 Missed Visits: 3     SUMMARY OF TREATMENT  Darline Sprague has been seen at our clinic1/wk for a total of 3 visits. Pt treatment has consisted of  therapeutic exercise for  postural correction and manual therapy (deep tissue mobilizations and cervical/thoracic mobilzations)    CURRENT STATUS  Pt has had a good tolerance to physical therapy treatment. He reported minimal pain in his last visit. He did not return to PT treatment after the 3rd visit.   r      RECOMMENDATIONS  Discharge from physical therapy treatment with HEP. Specifics:   If you have any questions/comments please contact us directly at [phone]. Thank you for allowing us to assist in the care of your patient.     Therapist Signature: Danay Deleon, IRMA, OCS, SCS, CSCS Date: 10/23/2017     Time: 11:10 AM

## 2018-04-26 ENCOUNTER — HOSPITAL ENCOUNTER (OUTPATIENT)
Dept: PHYSICAL THERAPY | Age: 28
Discharge: HOME | End: 2018-04-26
Payer: COMMERCIAL

## 2018-04-26 PROCEDURE — 97535 SELF CARE MNGMENT TRAINING: CPT

## 2018-04-26 PROCEDURE — 97162 PT EVAL MOD COMPLEX 30 MIN: CPT

## 2018-04-26 NOTE — PROGRESS NOTES
[address] PHYSICAL THERAPY  [address] [place] [address], [place] [address],[facility], 70 [facility] - Phone: [phone]  Fax: [phone] / [address]  Patient Name: Keny Mercado : 1990   Medical   Diagnosis: Pain in both feet [M79.671, M79.672]  Pain in both lower legs [M79.661, M79.662] Treatment Diagnosis: Pain in both feet [M79.671, M79.672]  Pain in both lower legs [M79.661, M79.662]   Onset Date: ~6mo ago     Referral Source: Marilee Valles DO [place] [facility] 2018   Prior Hospitalization: See medical history Provider #: [id number]   Prior Level of Function: Pain free amb   Comorbidities: none   Medications: Verified on Patient Summary List   The Plan of Care and following information is based on the information from the initial evaluation.   ===========================================================================================  Assessment / garces information:  Keny Mercado is a 24 y.o.  yo male with Dx of Pain in both feet [M79.671, M79.672]  Pain in both lower legs [M79.661, M79.662]. He reports insidious onset of B feet pain ~6mo ago. He currently rates his pain as 8/10 at worst, 0/10 at best, primarily located at medial plantar aspect of B feet. He complains of difficulty and increase pain with prolonged walking and when he first get up in the am.  Objective Findings:  Gait: mod increased in pronation noted (R>L). Ankle AROM: DF: R = 13 deg, L = 15 deg. PF: R = 40 deg, L = 40 deg. Hallux DF: R = 67 deg, L = 58 deg. Manual Muscle Testing:   B hip abd and ext are Reduced. Special TEst: SL Balance:  Difficulty on R with sig lat weight shift. Palpation:  Increase in soft tissue tension noted at R hip abd, L QL, and B abductor hallucis noted.   Pt instructed in [facility] and will f/u in clinic for PT.  ===========================================================================================  Eval Complexity: History MEDIUM  Complexity : 1-2 comorbidities / personal factors will impact the outcome/ POC ;  Examination  MEDIUM Complexity : 3 Standardized tests and measures addressing body structure, function, activity limitation and / or participation in recreation ; Presentation MEDIUM Complexity : Evolving with changing characteristics ; Decision Making MEDIUM Complexity : FOTO score of 26-74; Overall Complexity MEDIUM  Problem List: pain affecting function, decrease ROM, decrease strength, impaired gait/ balance, decrease ADL/ functional abilitiies and decrease activity tolerance   Treatment Plan may include any combination of the following: Therapeutic exercise, Therapeutic activities, Neuromuscular re-education, Physical agent/modality, Gait/balance training, Manual therapy, Patient education, Self Care training and Functional mobility training  Patient / Family readiness to learn indicated by: asking questions, trying to perform skills and interest  Persons(s) to be included in education: patient (P)  Barriers to Learning/Limitations: no  Measures taken: FOTO = 62%   Patient Goal (s): Decrease pain    Patient self reported health status: fair  Rehabilitation Potential: good   Short Term Goals: To be accomplished in  1-2  weeks:  1. Independent with HEP. 2. Decrease max pain 25-50% to assist with amb   Long Term Goals: To be accomplished in  3-4  weeks:  1. Decrease max pain 50-75% to assist with amb  2. Increase FOTO score to 70% to show functional improvment. 3.  Will rate >/= +5 on Global Rating of Change and be prepared to DC to HEP.   Frequency / Duration:   Patient to be seen  2-3  times per week for 3-4  weeks:  Patient / Caregiver education and instruction: self care and exercises    Therapist Signature: Ramandeep Pyo, DPT, OCS, SCS, CSCS Date: 8/59/2588   Certification Period: na Time: 9:13 AM   ===========================================================================================  I certify that the above Physical Therapy Services are being furnished while the patient is under my care. I agree with the treatment plan and certify that this therapy is necessary. Physician Signature:        Date:       Time:     Please sign and return to [facility] or you may fax the signed copy to [phone]. Thank you.

## 2018-04-26 NOTE — PROGRESS NOTES
PHYSICAL THERAPY - DAILY TREATMENT NOTE    Patient Name: Julio Petersen        Date: 2018  : 1990   YES Patient  Verified  Visit #:     Insurance: Payor: Lawanda Zelaya / Plan: [address] [place] [address] Rd PT / Product Type: Commerical /      In time: 8:30 Out time: 9:00   Total Treatment Time: 30     Medicare Time Tracking (below)   Total Timed Codes (min):  na 1:1 Treatment Time:  na     TREATMENT AREA =  Pain in both feet [M79.671, M79.672]  Pain in both lower legs [M79.661, M79.662]    SUBJECTIVE  Pain Level (on 0 to 10 scale):    Medication Changes/New allergies or changes in medical history, any new surgeries or procedures? NO    If yes, update Summary List   Subjective Functional Status/Changes:  []  No changes reported     SEE IE          OBJECTIVE       min Patient Education:  YES  Reviewed HEP   []  Progressed/Changed HEP based on: Other Objective/Functional Measures:    SEE IE     Post Treatment Pain Level (on 0 to 10) scale:       ASSESSMENT  Assessment/Changes in Function:     SEE IE     []  See Progress Note/Recertification   Patient will continue to benefit from skilled PT services to modify and progress therapeutic interventions, address functional mobility deficits, address ROM deficits, address strength deficits, analyze and address soft tissue restrictions, analyze and cue movement patterns, analyze and modify body mechanics/ergonomics and assess and modify postural abnormalities to attain remaining goals.    Progress toward goals / Updated goals:         PLAN  []  Upgrade activities as tolerated YES Continue plan of care   []  Discharge due to :    []  Other:      Therapist: Melina Mtz, PT, OCS, SCS, CSCS    Date: 2018 Time: 9:12 AM       Future Appointments  Date Time Provider Magan Silva   2018 5:00 PM [facility]

## 2018-05-01 ENCOUNTER — HOSPITAL ENCOUNTER (OUTPATIENT)
Dept: PHYSICAL THERAPY | Age: 28
Discharge: HOME | End: 2018-05-01
Payer: COMMERCIAL

## 2018-05-01 PROCEDURE — 97110 THERAPEUTIC EXERCISES: CPT

## 2018-05-01 NOTE — PROGRESS NOTES
PHYSICAL THERAPY - DAILY TREATMENT NOTE    Patient Name: Martha Covarrubias        Date: 2018  : 1990   YES Patient  Verified  Visit #:   2   of   12  Insurance: Payor: Mega Johnston / Plan: [address] [place] [address] [facility] PT / Product Type: Commerical /      In time: 4:55 Out time: 5:30   Total Treatment Time: 35     Medicare Time Tracking (below)   Total Timed Codes (min):  na 1:1 Treatment Time:  na     TREATMENT AREA =  Pain in both feet [M79.671, M79.672]  Pain in both lower legs [M79.661, M79.662]    SUBJECTIVE  Pain Level (on 0 to 10 scale):   10   Medication Changes/New allergies or changes in medical history, any new surgeries or procedures? NO    If yes, update Summary List   Subjective Functional Status/Changes:  []  No changes reported     No new c/o          OBJECTIVE    35 min Therapeutic Exercise:  [x]  See flow sheet   Rationale:      increase ROM, increase strength and improve coordination to improve the patients ability to amb      min Patient Education:  YES  Reviewed HEP   []  Progressed/Changed HEP based on: Other Objective/Functional Measures:    Difficulty engaging glute with SL bridge     Post Treatment Pain Level (on 0 to 10) scale:   4  / 10     ASSESSMENT  Assessment/Changes in Function:     Good magdiel to all Rx without increase in pain      []  See Progress Note/Recertification   Patient will continue to benefit from skilled PT services to modify and progress therapeutic interventions, address functional mobility deficits, address ROM deficits, address strength deficits, analyze and address soft tissue restrictions, analyze and cue movement patterns, analyze and modify body mechanics/ergonomics and assess and modify postural abnormalities to attain remaining goals. Progress toward goals / Updated goals:     Independent with HEP     PLAN  []  Upgrade activities as tolerated YES Continue plan of care   []  Discharge due to :    []  Other:      Therapist: Ry Lee, PT, OCS, SCS, CSCS Date: 5/1/2018 Time: 2:58 PM       Future Appointments  Date Time Provider Magan Silva   5/1/2018 5:00 PM [facility]

## 2018-05-25 ENCOUNTER — HOSPITAL ENCOUNTER (OUTPATIENT)
Dept: PHYSICAL THERAPY | Age: 28
End: 2018-05-25
Payer: COMMERCIAL

## 2018-06-05 NOTE — PROGRESS NOTES
[address] [place] [address] PHYSICAL THERAPY  [address] [place] [address], [place] [address],[facility], 70 [facility] - Phone: [phone]  Fax: [phone] SUMMARY  Patient Name: Felicitas Betancourt : 1990   Treatment/Medical Diagnosis: Pain in both feet [M79.671, M79.672]  Pain in both lower legs [M79.661, M79.662]   Referral Source: Ben Price DO     Date of Initial Visit: 18 Attended Visits: 2 Missed Visits: 1     SUMMARY OF TREATMENT  Felicitas Betancourt has been seen at our clinic 2-3x/wk for a total of 2 visits. Pt treatment has consisted of therapeutic exercise for ankle ROM, ankle stability, and manual therapy (jt mobilization and deep tissue mobilization)    CURRENT STATUS  Pt has had a good tolerance to physical therapy treatment. He did not return to PT treatment after the 2nd visit. RECOMMENDATIONS  Discharge from physical therapy treatment with HEP  Specifics:   If you have any questions/comments please contact us directly at [phone]. Thank you for allowing us to assist in the care of your patient.     Therapist Signature: Van Galicia DPT, OCS, SCS, CSCS Date: 2018     Time: 6:30 PM

## 2018-06-06 ENCOUNTER — APPOINTMENT (OUTPATIENT)
Dept: PHYSICAL THERAPY | Age: 28
End: 2018-06-06